# Patient Record
Sex: FEMALE | Race: OTHER | Employment: OTHER | URBAN - METROPOLITAN AREA
[De-identification: names, ages, dates, MRNs, and addresses within clinical notes are randomized per-mention and may not be internally consistent; named-entity substitution may affect disease eponyms.]

---

## 2022-02-09 ENCOUNTER — COMPREHENSIVE EXAM (OUTPATIENT)
Dept: URBAN - METROPOLITAN AREA CLINIC 38 | Facility: CLINIC | Age: 62
End: 2022-02-09

## 2022-02-09 DIAGNOSIS — H52.4: ICD-10-CM

## 2022-02-09 DIAGNOSIS — Z97.3: ICD-10-CM

## 2022-02-09 DIAGNOSIS — H52.203: ICD-10-CM

## 2022-02-09 DIAGNOSIS — H52.03: ICD-10-CM

## 2022-02-09 PROCEDURE — 92310PDW PREMIUM SPECIALTY DAILY WEAR

## 2022-02-09 PROCEDURE — 92310-3 LEVEL 3 CONTACT LENS MANAGEMENT

## 2022-02-09 PROCEDURE — 92014 COMPRE OPH EXAM EST PT 1/>: CPT

## 2022-02-09 PROCEDURE — 92015 DETERMINE REFRACTIVE STATE: CPT

## 2022-02-09 ASSESSMENT — TONOMETRY
OS_IOP_MMHG: 14
OD_IOP_MMHG: 13

## 2022-02-09 ASSESSMENT — VISUAL ACUITY
OD_CC: 20/25
OD_SC: J10
OS_SC: 20/70
OD_SC: 20/100
OS_SC: J6
OD_CC: J1
OS_CC: 20/20
OS_CC: J2

## 2022-11-07 ENCOUNTER — EMERGENCY VISIT (OUTPATIENT)
Dept: URBAN - METROPOLITAN AREA CLINIC 43 | Facility: CLINIC | Age: 62
End: 2022-11-07

## 2022-11-07 DIAGNOSIS — H25.13: ICD-10-CM

## 2022-11-07 DIAGNOSIS — H43.811: ICD-10-CM

## 2022-11-07 PROCEDURE — 92250 FUNDUS PHOTOGRAPHY W/I&R: CPT

## 2022-11-07 PROCEDURE — 92012 INTRM OPH EXAM EST PATIENT: CPT

## 2022-11-07 ASSESSMENT — VISUAL ACUITY
OS_CC: 20/20
OD_CC: 20/20

## 2022-11-07 ASSESSMENT — TONOMETRY
OD_IOP_MMHG: 14
OS_IOP_MMHG: 16

## 2023-10-23 ENCOUNTER — COMPREHENSIVE EXAM (OUTPATIENT)
Dept: URBAN - METROPOLITAN AREA CLINIC 40 | Facility: CLINIC | Age: 63
End: 2023-10-23

## 2023-10-23 DIAGNOSIS — H52.03: ICD-10-CM

## 2023-10-23 DIAGNOSIS — H52.203: ICD-10-CM

## 2023-10-23 DIAGNOSIS — H52.4: ICD-10-CM

## 2023-10-23 DIAGNOSIS — Z97.3: ICD-10-CM

## 2023-10-23 PROCEDURE — 92310-3 LEVEL 3 CONTACT LENS MANAGEMENT

## 2023-10-23 PROCEDURE — 92015 DETERMINE REFRACTIVE STATE: CPT

## 2023-10-23 PROCEDURE — 92014 COMPRE OPH EXAM EST PT 1/>: CPT

## 2023-10-23 ASSESSMENT — TONOMETRY
OS_IOP_MMHG: 14
OD_IOP_MMHG: 14

## 2023-10-23 ASSESSMENT — VISUAL ACUITY
OD_CC: 20/20
OS_SC: J12
OD_CC: J1
OS_CC: J1-
OS_SC: 20/80
OS_CC: 20/20-
OD_SC: <J12
OD_SC: 20/80+
